# Patient Record
Sex: FEMALE | Race: WHITE | NOT HISPANIC OR LATINO | URBAN - METROPOLITAN AREA
[De-identification: names, ages, dates, MRNs, and addresses within clinical notes are randomized per-mention and may not be internally consistent; named-entity substitution may affect disease eponyms.]

---

## 2023-05-11 LAB — HEPATITIS B VIRUS SURFACE AB (MIU/ML) IN SERUM: <3.1 MIU/ML

## 2023-05-12 LAB
MUMPS IGG ANTIBODY: POSITIVE
RUBELLA VIRUS IGG AB: POSITIVE
RUBEOLA IGG ANTIBODY: POSITIVE
VARICELLA ZOSTER IGG: NORMAL

## 2023-05-13 LAB
NIL(NEG) CONTROL SPOT COUNT: NORMAL
PANEL A SPOT COUNT: 0
PANEL B SPOT COUNT: 0
POS CONTROL SPOT COUNT: NORMAL
T-SPOT. TB INTERPRETATION: NEGATIVE

## 2023-12-15 PROBLEM — Z86.16 HISTORY OF SEVERE ACUTE RESPIRATORY SYNDROME CORONAVIRUS 2 (SARS-COV-2) DISEASE: Status: ACTIVE | Noted: 2020-09-01

## 2023-12-15 PROBLEM — N39.0 URINARY TRACT INFECTIOUS DISEASE: Status: RESOLVED | Noted: 2021-01-15 | Resolved: 2023-12-15

## 2023-12-15 PROBLEM — G43.109 OPHTHALMIC MIGRAINE: Status: ACTIVE | Noted: 2018-11-07

## 2023-12-15 PROBLEM — S93.409A ANKLE SPRAIN: Status: RESOLVED | Noted: 2023-12-15 | Resolved: 2023-12-15

## 2023-12-15 PROBLEM — F41.9 ANXIETY: Status: ACTIVE | Noted: 2022-11-23

## 2023-12-15 PROBLEM — M93.20 OSTEOCHONDRITIS DISSECANS: Status: RESOLVED | Noted: 2023-12-15 | Resolved: 2023-12-15

## 2023-12-15 PROBLEM — N94.6 DYSMENORRHEA: Status: ACTIVE | Noted: 2018-11-08

## 2023-12-15 PROBLEM — E78.2 MIXED HYPERLIPIDEMIA: Status: ACTIVE | Noted: 2021-08-02

## 2023-12-18 VITALS
DIASTOLIC BLOOD PRESSURE: 86 MMHG | HEART RATE: 131 BPM | SYSTOLIC BLOOD PRESSURE: 130 MMHG | WEIGHT: 155.6 LBS | BODY MASS INDEX: 28.63 KG/M2 | HEIGHT: 62 IN

## 2023-12-18 RX ORDER — CETIRIZINE HYDROCHLORIDE 10 MG/1
10 TABLET ORAL DAILY
COMMUNITY
End: 2023-12-21 | Stop reason: WASHOUT

## 2023-12-18 RX ORDER — ADAPALENE AND BENZOYL PEROXIDE 3; 25 MG/G; MG/G
GEL TOPICAL DAILY
COMMUNITY
End: 2023-12-21 | Stop reason: WASHOUT

## 2023-12-18 RX ORDER — DROSPIRENONE AND ETHINYL ESTRADIOL 0.02-3(28)
1 KIT ORAL DAILY
COMMUNITY
End: 2023-12-21 | Stop reason: WASHOUT

## 2023-12-20 PROBLEM — T78.40XA ALLERGY: Status: ACTIVE | Noted: 2023-12-20

## 2023-12-20 PROBLEM — Z86.16 HISTORY OF SEVERE ACUTE RESPIRATORY SYNDROME CORONAVIRUS 2 (SARS-COV-2) DISEASE: Status: RESOLVED | Noted: 2020-09-01 | Resolved: 2023-12-20

## 2023-12-21 ENCOUNTER — OFFICE VISIT (OUTPATIENT)
Dept: PEDIATRICS | Facility: CLINIC | Age: 23
End: 2023-12-21
Payer: COMMERCIAL

## 2023-12-21 VITALS
SYSTOLIC BLOOD PRESSURE: 130 MMHG | WEIGHT: 167 LBS | DIASTOLIC BLOOD PRESSURE: 87 MMHG | HEIGHT: 62 IN | BODY MASS INDEX: 30.73 KG/M2 | HEART RATE: 129 BPM

## 2023-12-21 DIAGNOSIS — Z13.9 SCREENING PROCEDURE: ICD-10-CM

## 2023-12-21 DIAGNOSIS — Z00.00 WELLNESS EXAMINATION: Primary | ICD-10-CM

## 2023-12-21 DIAGNOSIS — Z23 NEED FOR VACCINATION: ICD-10-CM

## 2023-12-21 DIAGNOSIS — Z13.220 LIPID SCREENING: ICD-10-CM

## 2023-12-21 DIAGNOSIS — Z23 IMMUNIZATION DUE: ICD-10-CM

## 2023-12-21 DIAGNOSIS — Z01.84 IMMUNITY STATUS TESTING: ICD-10-CM

## 2023-12-21 PROCEDURE — 90715 TDAP VACCINE 7 YRS/> IM: CPT | Performed by: PEDIATRICS

## 2023-12-21 PROCEDURE — 90472 IMMUNIZATION ADMIN EACH ADD: CPT | Performed by: PEDIATRICS

## 2023-12-21 PROCEDURE — 99395 PREV VISIT EST AGE 18-39: CPT | Performed by: PEDIATRICS

## 2023-12-21 PROCEDURE — 96127 BRIEF EMOTIONAL/BEHAV ASSMT: CPT | Performed by: PEDIATRICS

## 2023-12-21 PROCEDURE — 1036F TOBACCO NON-USER: CPT | Performed by: PEDIATRICS

## 2023-12-21 PROCEDURE — 90716 VAR VACCINE LIVE SUBQ: CPT | Performed by: PEDIATRICS

## 2023-12-21 PROCEDURE — 3008F BODY MASS INDEX DOCD: CPT | Performed by: PEDIATRICS

## 2023-12-21 PROCEDURE — 90471 IMMUNIZATION ADMIN: CPT | Performed by: PEDIATRICS

## 2023-12-21 RX ORDER — NORGESTIMATE AND ETHINYL ESTRADIOL 7DAYSX3 LO
1 KIT ORAL DAILY
COMMUNITY
Start: 2023-01-23 | End: 2023-12-21 | Stop reason: SINTOL

## 2023-12-21 NOTE — PROGRESS NOTES
"Subjective   History was provided by Waldemar.  Waldemar Ibarra is a 23 y.o. female who is here for this well visit.      Current Issues:  Current concerns: papule under left breast; HR 190s with intense exercise when wasn't in shape  Vision or hearing concerns? no  Dental care up to date? Yes- brushes teeth 2 times/day , regular dental visits , does floss teeth   No significant recent health issues.   No Specialist visits.      Review of Nutrition, Elimination, and Sleep:  Current diet:  3 meals/day , well balanced diet , normal portions , <8oz. sugar containing beverages daily , appropriate dairy intake , diet includes fruits and vegetables and protein.  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 Current relationship - none.     Sports Participation Screening:  Gets regular exercise historically    Screening Questions:  Other: normal mood, satisfied with body weight  Risk factors for dyslipidemia: no  Risk factors for sexually-transmitted infections:   Sexually active: no   Using condoms: N/A  Substance use:  Smoking - No  Vaping - No  Drinking - Yes  Drugs - No  Genitourinary:  normal menses - no issues    Objective   /87   Pulse (!) 129   Ht 1.562 m (5' 1.5\")   Wt 75.8 kg (167 lb)   BMI 31.04 kg/m²   Growth parameters are noted and are appropriate for age.    Physical Exam  Constitutional:       Appearance: Normal appearance.   HENT:      Right Ear: Tympanic membrane normal.      Left Ear: Tympanic membrane normal.      Nose: Nose normal.      Mouth/Throat:      Mouth: Mucous membranes are moist.      Pharynx: Oropharynx is clear.   Eyes:      Extraocular Movements: Extraocular movements intact.   Cardiovascular:      Rate and " Rhythm: Normal rate and regular rhythm.      Pulses:           Femoral pulses are 2+ on the right side and 2+ on the left side.     Heart sounds: No murmur heard.  Pulmonary:      Effort: Pulmonary effort is normal.      Breath sounds: Normal breath sounds.   Chest:   Breasts:     Jamey Score is 5.      Breasts are symmetrical.   Abdominal:      General: Abdomen is flat.      Palpations: Abdomen is soft. There is no hepatomegaly, splenomegaly or mass.   Genitourinary:     Comments: Pt declines exam  Musculoskeletal:         General: Normal range of motion.      Cervical back: Normal range of motion and neck supple.      Thoracic back: No scoliosis.      Lumbar back: No scoliosis.   Lymphadenopathy:      Cervical: No cervical adenopathy.   Skin:     General: Skin is warm.      Findings: No acne.   Neurological:      General: No focal deficit present.      Mental Status: She is alert.      Deep Tendon Reflexes:      Reflex Scores:       Patellar reflexes are 2+ on the right side and 2+ on the left side.  Psychiatric:         Mood and Affect: Mood normal.         Behavior: Behavior normal.         Assessment/Plan   Waldemar was seen today for well child.  Diagnoses and all orders for this visit:  Wellness examination (Primary)  Lipid screening  -     Lipid Panel; Future  Screening procedure  Need for vaccination  -     Tdap vaccine, age 7 years and older  Flu vaccine need  Immunity status testing  -     Hepatitis B Surface Antibody; Future  -     Varicella Zoster Antibody, IgG; Future  Immunization due  -     Varicella vaccine, subcutaneous (VARIVAX)    Well young adult.  - Anticipatory guidance discussed.   - Injury prevention: wearing seatbelt , understanding sun protection , understanding conflict resolution/violence prevention,  reviewed driving safety    -Risk Taking: cardiac risk factors reviewed , alcohol, drug and tobacco use reviewed , reviewed internet safety      - Growth and weight gain appropriate. The  patient was counseled regarding nutrition and physical activity.  - Development: appropriate for age  - Immunizations today: per orders. All vaccines given at today’s visit were reviewed with the family. Risks/benefits/side effects discussed and VIS sheet provided. All questions answered. Given with consent   - Healthy young adult. Discussed ongoing decision making in regards to safety, nutrition, exercise, and health. Discussed the interaction of family health history and personal healthy life style choices including those affecting physical health, mental/emotional health, and safety. Referred to adult care provider.   - [facility]  [place]:  Let them know I referred you if they ask!  Sherie Galloway  [phone] [facility]  Jeannie Yesy  [phone] [facility]  Agapito Alton  [phone] [facility]  [phone] [facility] - also does gynecology care  Kenji Ansari [phone] [facility]  Marian Casanova  [phone] [facility] [place]    [place]:  Brittany Behm  [phone] [place]

## 2024-01-02 ENCOUNTER — CLINICAL SUPPORT (OUTPATIENT)
Dept: PEDIATRICS | Facility: CLINIC | Age: 24
End: 2024-01-02
Payer: COMMERCIAL

## 2024-01-02 PROCEDURE — 90471 IMMUNIZATION ADMIN: CPT | Performed by: PEDIATRICS

## 2024-01-02 PROCEDURE — 90743 HEPB VACC 2 DOSE ADOLESC IM: CPT | Performed by: PEDIATRICS

## 2024-04-29 ENCOUNTER — APPOINTMENT (OUTPATIENT)
Dept: PRIMARY CARE | Facility: CLINIC | Age: 24
End: 2024-04-29
Payer: COMMERCIAL

## 2024-05-06 ENCOUNTER — TELEMEDICINE (OUTPATIENT)
Dept: PRIMARY CARE | Facility: CLINIC | Age: 24
End: 2024-05-06
Payer: COMMERCIAL

## 2024-05-06 DIAGNOSIS — Z01.84 IMMUNITY STATUS TESTING: Primary | ICD-10-CM

## 2024-05-06 DIAGNOSIS — Z00.00 ENCOUNTER FOR ANNUAL PHYSICAL EXAM: ICD-10-CM

## 2024-05-06 DIAGNOSIS — R79.89 LOW VITAMIN D LEVEL: ICD-10-CM

## 2024-05-06 DIAGNOSIS — L70.0 ACNE VULGARIS: ICD-10-CM

## 2024-05-06 PROCEDURE — 99204 OFFICE O/P NEW MOD 45 MIN: CPT | Performed by: FAMILY MEDICINE

## 2024-05-06 RX ORDER — CHOLECALCIFEROL (VITAMIN D3) 25 MCG
1000 TABLET ORAL DAILY
COMMUNITY

## 2024-05-06 NOTE — PROGRESS NOTES
Subjective   Waldemar Ibarra is a 24 y.o. female who presents for No chief complaint on file..  HPI    Was on OCP for painful periods during HS. Changed to new brand which made her feel unwell and stopped lasted yr. Notes wt fluctuations. 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 No immed Fhx of breast/colon/melanoma ca     Not sexually active and does not want to be on BC currently.     Has not had pap.     Anxiety can be worse w tests.     Takes MVI and vit D.     BP at nursing school has been good.     2 UTIs in college.     An interactive audio and video telecommunication system which permits real time communications between the patient  (at the originating site) and provider (at the distant site) was utilized to provide this telehealth service. Verbal consent was requested and obtained today for a telehealth visit.  Current Outpatient Medications on File Prior to Visit   Medication Sig Dispense Refill    cholecalciferol (Vitamin D3) 25 MCG (1000 UT) tablet Take 1 tablet (1,000 Units) by mouth once daily.      multivitamin with minerals iron-free (Centrum Silver) Take 1 tablet by mouth once daily.       No current facility-administered medications on file prior to visit.                  Objective   There were no vitals taken for this visit.   Physical Exam  General: NAD  HEENT:NCAT  Lungs: no audible wheeze or rhonchi   Skin: no rashes  Psych: cooperative, appropriate affect  Neuro: speech clear. A&Ox3    Assessment/Plan   Problem List Items Addressed This Visit    None  Visit Diagnoses       Immunity status testing    -  Primary  hepB and VZ titer low  Had HCV and VZ vacc in Jan  Check titers, required for school       Relevant Orders    Hepatitis B Surface Antibody    Varicella Zoster Antibody, IgG    Encounter for annual physical exam      CPE 2-3 mo w pap labs prior       Relevant Orders    CBC and Auto Differential    Comprehensive Metabolic Panel    Hemoglobin A1C     Lipid Panel    TSH with reflex to Free T4 if abnormal    Low vitamin D level      Check Vit D w labs  Take vit D 1000 international units       Acne vulgaris      Controlled off OCP.   On BP and adapalene     Situational anxiety: manageable, exac by tests     Declines STI testing/OCP. Pap at f/up

## 2024-05-08 ENCOUNTER — TELEPHONE (OUTPATIENT)
Dept: PRIMARY CARE | Facility: CLINIC | Age: 24
End: 2024-05-08
Payer: COMMERCIAL

## 2024-08-15 ENCOUNTER — TELEPHONE (OUTPATIENT)
Dept: PRIMARY CARE | Facility: CLINIC | Age: 24
End: 2024-08-15
Payer: COMMERCIAL

## 2024-08-15 NOTE — TELEPHONE ENCOUNTER
Note on 8/29 CPE appt no labs, but labs were ordered (not completed) for 5/6/24.  Send reminder to Pt to complete labs?

## 2024-08-22 ENCOUNTER — LAB (OUTPATIENT)
Dept: LAB | Facility: LAB | Age: 24
End: 2024-08-22
Payer: COMMERCIAL

## 2024-08-22 DIAGNOSIS — Z00.00 ENCOUNTER FOR ANNUAL PHYSICAL EXAM: ICD-10-CM

## 2024-08-22 DIAGNOSIS — R79.89 LOW VITAMIN D LEVEL: ICD-10-CM

## 2024-08-22 DIAGNOSIS — Z01.84 IMMUNITY STATUS TESTING: ICD-10-CM

## 2024-08-22 LAB
25(OH)D3 SERPL-MCNC: 30 NG/ML (ref 30–100)
ALBUMIN SERPL BCP-MCNC: 4.3 G/DL (ref 3.4–5)
ALP SERPL-CCNC: 81 U/L (ref 33–110)
ALT SERPL W P-5'-P-CCNC: 25 U/L (ref 7–45)
ANION GAP SERPL CALC-SCNC: 13 MMOL/L (ref 10–20)
AST SERPL W P-5'-P-CCNC: 19 U/L (ref 9–39)
BASOPHILS # BLD AUTO: 0.04 X10*3/UL (ref 0–0.1)
BASOPHILS NFR BLD AUTO: 0.4 %
BILIRUB SERPL-MCNC: 0.4 MG/DL (ref 0–1.2)
BUN SERPL-MCNC: 10 MG/DL (ref 6–23)
CALCIUM SERPL-MCNC: 9.5 MG/DL (ref 8.6–10.6)
CHLORIDE SERPL-SCNC: 104 MMOL/L (ref 98–107)
CHOLEST SERPL-MCNC: 167 MG/DL (ref 0–199)
CHOLESTEROL/HDL RATIO: 2.8
CO2 SERPL-SCNC: 25 MMOL/L (ref 21–32)
CREAT SERPL-MCNC: 0.67 MG/DL (ref 0.5–1.05)
EGFRCR SERPLBLD CKD-EPI 2021: >90 ML/MIN/1.73M*2
EOSINOPHIL # BLD AUTO: 0.2 X10*3/UL (ref 0–0.7)
EOSINOPHIL NFR BLD AUTO: 2.1 %
ERYTHROCYTE [DISTWIDTH] IN BLOOD BY AUTOMATED COUNT: 12.7 % (ref 11.5–14.5)
EST. AVERAGE GLUCOSE BLD GHB EST-MCNC: 103 MG/DL
GLUCOSE SERPL-MCNC: 94 MG/DL (ref 74–99)
HBA1C MFR BLD: 5.2 %
HBV SURFACE AB SER-ACNC: 37.4 MIU/ML
HCT VFR BLD AUTO: 36.8 % (ref 36–46)
HDLC SERPL-MCNC: 60.5 MG/DL
HGB BLD-MCNC: 11.7 G/DL (ref 12–16)
IMM GRANULOCYTES # BLD AUTO: 0.05 X10*3/UL (ref 0–0.7)
IMM GRANULOCYTES NFR BLD AUTO: 0.5 % (ref 0–0.9)
LDLC SERPL CALC-MCNC: 91 MG/DL
LYMPHOCYTES # BLD AUTO: 1.75 X10*3/UL (ref 1.2–4.8)
LYMPHOCYTES NFR BLD AUTO: 18.7 %
MCH RBC QN AUTO: 26.3 PG (ref 26–34)
MCHC RBC AUTO-ENTMCNC: 31.8 G/DL (ref 32–36)
MCV RBC AUTO: 83 FL (ref 80–100)
MONOCYTES # BLD AUTO: 0.56 X10*3/UL (ref 0.1–1)
MONOCYTES NFR BLD AUTO: 6 %
NEUTROPHILS # BLD AUTO: 6.75 X10*3/UL (ref 1.2–7.7)
NEUTROPHILS NFR BLD AUTO: 72.3 %
NON HDL CHOLESTEROL: 107 MG/DL (ref 0–149)
NRBC BLD-RTO: 0 /100 WBCS (ref 0–0)
PLATELET # BLD AUTO: 364 X10*3/UL (ref 150–450)
POTASSIUM SERPL-SCNC: 4.3 MMOL/L (ref 3.5–5.3)
PROT SERPL-MCNC: 7.4 G/DL (ref 6.4–8.2)
RBC # BLD AUTO: 4.45 X10*6/UL (ref 4–5.2)
SODIUM SERPL-SCNC: 138 MMOL/L (ref 136–145)
TRIGL SERPL-MCNC: 79 MG/DL (ref 0–149)
TSH SERPL-ACNC: 1.09 MIU/L (ref 0.44–3.98)
VARICELLA ZOSTER IGG INDEX: 2.1 IA
VLDL: 16 MG/DL (ref 0–40)
VZV IGG SER QL IA: POSITIVE
WBC # BLD AUTO: 9.4 X10*3/UL (ref 4.4–11.3)

## 2024-08-22 PROCEDURE — 82306 VITAMIN D 25 HYDROXY: CPT

## 2024-08-22 PROCEDURE — 85025 COMPLETE CBC W/AUTO DIFF WBC: CPT

## 2024-08-22 PROCEDURE — 86787 VARICELLA-ZOSTER ANTIBODY: CPT

## 2024-08-22 PROCEDURE — 83036 HEMOGLOBIN GLYCOSYLATED A1C: CPT

## 2024-08-22 PROCEDURE — 80053 COMPREHEN METABOLIC PANEL: CPT

## 2024-08-22 PROCEDURE — 86706 HEP B SURFACE ANTIBODY: CPT

## 2024-08-22 PROCEDURE — 84443 ASSAY THYROID STIM HORMONE: CPT

## 2024-08-22 PROCEDURE — 36415 COLL VENOUS BLD VENIPUNCTURE: CPT

## 2024-08-22 PROCEDURE — 80061 LIPID PANEL: CPT

## 2024-08-29 ENCOUNTER — APPOINTMENT (OUTPATIENT)
Dept: PRIMARY CARE | Facility: CLINIC | Age: 24
End: 2024-08-29
Payer: COMMERCIAL

## 2024-08-29 VITALS
BODY MASS INDEX: 31.76 KG/M2 | OXYGEN SATURATION: 99 % | HEART RATE: 80 BPM | DIASTOLIC BLOOD PRESSURE: 80 MMHG | HEIGHT: 62 IN | WEIGHT: 172.6 LBS | TEMPERATURE: 98.2 F | RESPIRATION RATE: 17 BRPM | SYSTOLIC BLOOD PRESSURE: 124 MMHG

## 2024-08-29 DIAGNOSIS — F43.9 SITUATIONAL STRESS: ICD-10-CM

## 2024-08-29 DIAGNOSIS — N92.0 MENORRHAGIA WITH REGULAR CYCLE: ICD-10-CM

## 2024-08-29 DIAGNOSIS — Z00.00 ENCOUNTER FOR ANNUAL PHYSICAL EXAM: Primary | ICD-10-CM

## 2024-08-29 DIAGNOSIS — D64.9 NORMOCYTIC ANEMIA: ICD-10-CM

## 2024-08-29 PROCEDURE — 3008F BODY MASS INDEX DOCD: CPT | Performed by: FAMILY MEDICINE

## 2024-08-29 PROCEDURE — 99395 PREV VISIT EST AGE 18-39: CPT | Performed by: FAMILY MEDICINE

## 2024-08-29 PROCEDURE — 1036F TOBACCO NON-USER: CPT | Performed by: FAMILY MEDICINE

## 2024-08-29 PROCEDURE — 99213 OFFICE O/P EST LOW 20 MIN: CPT | Performed by: FAMILY MEDICINE

## 2024-08-29 NOTE — PROGRESS NOTES
"Subjective   Waldemar Ibarra is a 24 y.o. female who presents for Annual Exam (Reschedule pap smear/).  HPI    Here for CPE    No pap d/t menses.     Off OCPs for the past yr and periods are heavy.   Acne worse. Wonders about hormone tests    HR in 200s w strenuous ex.     No immed Fhx breast ca colon ca     Skin ca in family, sees derm regularly.     Wonders about wt mgnt recs   Current Outpatient Medications on File Prior to Visit   Medication Sig Dispense Refill    cholecalciferol (Vitamin D3) 25 MCG (1000 UT) tablet Take 1 tablet (1,000 Units) by mouth once daily.      loratadine (CLARITIN ORAL) Take by mouth if needed.      multivitamin with minerals iron-free (Centrum Silver) Take 1 tablet by mouth once daily.       No current facility-administered medications on file prior to visit.                  Objective   /80 (BP Location: Left arm)   Pulse 80   Temp 36.8 °C (98.2 °F)   Resp 17   Ht 1.562 m (5' 1.5\")   Wt 78.3 kg (172 lb 9.6 oz)   SpO2 99%   BMI 32.08 kg/m²    Physical Exam  General: NAD  HEENT:NCAT, PERRLA, nml OP, b/l TM clear   Neck: no cervical MELY  Heart: RRR no murmur, no edema   Lungs: CTA b/l, no wheeze or rhonchi   GI: abd soft, nontender, nondistended.   MSK: no c/c/e. nml gait   Skin: warm and dry  Psych: cooperative, appropriate affect  Neuro: speech clear. A&Ox3  Assessment/Plan   Problem List Items Addressed This Visit    None  Visit Diagnoses       Encounter for annual physical exam    -  Primary  CPE:overall doing well. Discussed diet/ex changes, rec WW or noom   BMI: 32  VACC: reviewed tdap UTD  COLON CA SCRN: 45  LABS: reviewed w pt at goal besides anemia  PAP: due, will janna d/t menses  MAMMO: 41  DEXA: n/a      Normocytic anemia      Likely d/y menorrhagia  Start slow Fe   Rpt labs 3 mo       Menorrhagia with regular cycle      Prefers to stay off OCPs  Check test ?PCOS    Relevant Orders    CBC and Auto Differential    Ferritin    Iron and TIBC    Testosterone, total " and free    BMI 32.0-32.9,adult      Discussed diet/ex changes  Consider WW/NOOM       Situational stress      Discussed stress mgnt techniques

## 2025-08-11 ENCOUNTER — TELEPHONE (OUTPATIENT)
Dept: PRIMARY CARE | Facility: CLINIC | Age: 25
End: 2025-08-11
Payer: COMMERCIAL

## 2025-08-11 DIAGNOSIS — Z00.00 ENCOUNTER FOR ANNUAL PHYSICAL EXAM: Primary | ICD-10-CM

## 2025-08-19 LAB
ALBUMIN SERPL-MCNC: 4.5 G/DL (ref 3.6–5.1)
ALP SERPL-CCNC: 66 U/L (ref 31–125)
ALT SERPL-CCNC: 31 U/L (ref 6–29)
ANION GAP SERPL CALCULATED.4IONS-SCNC: 11 MMOL/L (CALC) (ref 7–17)
AST SERPL-CCNC: 23 U/L (ref 10–30)
BASOPHILS # BLD AUTO: 33 CELLS/UL (ref 0–200)
BASOPHILS NFR BLD AUTO: 0.4 %
BILIRUB SERPL-MCNC: 0.4 MG/DL (ref 0.2–1.2)
BUN SERPL-MCNC: 14 MG/DL (ref 7–25)
CALCIUM SERPL-MCNC: 9.5 MG/DL (ref 8.6–10.2)
CHLORIDE SERPL-SCNC: 103 MMOL/L (ref 98–110)
CHOLEST SERPL-MCNC: 178 MG/DL
CHOLEST/HDLC SERPL: 2.7 (CALC)
CO2 SERPL-SCNC: 23 MMOL/L (ref 20–32)
CREAT SERPL-MCNC: 0.72 MG/DL (ref 0.5–0.96)
EGFRCR SERPLBLD CKD-EPI 2021: 120 ML/MIN/1.73M2
EOSINOPHIL # BLD AUTO: 189 CELLS/UL (ref 15–500)
EOSINOPHIL NFR BLD AUTO: 2.3 %
ERYTHROCYTE [DISTWIDTH] IN BLOOD BY AUTOMATED COUNT: 12.3 % (ref 11–15)
EST. AVERAGE GLUCOSE BLD GHB EST-MCNC: 105 MG/DL
EST. AVERAGE GLUCOSE BLD GHB EST-SCNC: 5.8 MMOL/L
GLUCOSE SERPL-MCNC: 88 MG/DL (ref 65–99)
HBA1C MFR BLD: 5.3 %
HCT VFR BLD AUTO: 42.4 % (ref 35–45)
HDLC SERPL-MCNC: 66 MG/DL
HGB BLD-MCNC: 13 G/DL (ref 11.7–15.5)
LDLC SERPL CALC-MCNC: 95 MG/DL (CALC)
LYMPHOCYTES # BLD AUTO: 1665 CELLS/UL (ref 850–3900)
LYMPHOCYTES NFR BLD AUTO: 20.3 %
MCH RBC QN AUTO: 26.9 PG (ref 27–33)
MCHC RBC AUTO-ENTMCNC: 30.7 G/DL (ref 32–36)
MCV RBC AUTO: 87.8 FL (ref 80–100)
MONOCYTES # BLD AUTO: 558 CELLS/UL (ref 200–950)
MONOCYTES NFR BLD AUTO: 6.8 %
NEUTROPHILS # BLD AUTO: 5756 CELLS/UL (ref 1500–7800)
NEUTROPHILS NFR BLD AUTO: 70.2 %
NONHDLC SERPL-MCNC: 112 MG/DL (CALC)
PLATELET # BLD AUTO: 357 THOUSAND/UL (ref 140–400)
PMV BLD REES-ECKER: 10.2 FL (ref 7.5–12.5)
POTASSIUM SERPL-SCNC: 4.8 MMOL/L (ref 3.5–5.3)
PROT SERPL-MCNC: 7.2 G/DL (ref 6.1–8.1)
RBC # BLD AUTO: 4.83 MILLION/UL (ref 3.8–5.1)
SODIUM SERPL-SCNC: 137 MMOL/L (ref 135–146)
TRIGL SERPL-MCNC: 77 MG/DL
TSH SERPL-ACNC: 1.01 MIU/L
WBC # BLD AUTO: 8.2 THOUSAND/UL (ref 3.8–10.8)

## 2025-09-05 ENCOUNTER — APPOINTMENT (OUTPATIENT)
Dept: PRIMARY CARE | Facility: CLINIC | Age: 25
End: 2025-09-05
Payer: COMMERCIAL

## 2025-09-05 ASSESSMENT — PATIENT HEALTH QUESTIONNAIRE - PHQ9
1. LITTLE INTEREST OR PLEASURE IN DOING THINGS: NOT AT ALL
SUM OF ALL RESPONSES TO PHQ9 QUESTIONS 1 AND 2: 0
2. FEELING DOWN, DEPRESSED OR HOPELESS: NOT AT ALL